# Patient Record
(demographics unavailable — no encounter records)

---

## 2017-05-10 NOTE — [facility]
PROCEDURE 

Screening 3Dmammogram. 

 

HISTORY 

Screening study. 

 

COMPARISON 

Comparison is made with the prior dated 2013 through 2016 

 

 

FINDINGS 

3D tomographic mammography images were obtained in the CC and [facility] 

projections and reviewed on a [employer] workstation. Heterogeneous dense 

breast parenchyma is seen bilaterally. No asymmetric breast masses or 

spiculated lesions or clustering of microcalcification are seen to 

indicate malignancy radiographically. 

 

IMPRESSION 

There are no radiographic indicators for malignancy.Recommend routine 

screening mammography in one year. 

BI-RADS CATEGORY 1: Negative mammogram 

The patient will be entered into the reminder system with a target date of

May 11, 2018. 

This study was interpreted with the benefit of Computerized Aided 

Detection (CAD). 

Mammography is not 100% sensitive in detecting breast cancer. Therefore, a

self breast exam and a clinical breast exam are very important. A negative

mammogram does not negate a clinically suspicious finding and should not 

result in a delay in biopsying a clinically suspicious abnormality. 

Based on current legislation, we are required to report to you the density

of your breast tissue. Although breast density does not affect your risk 

for developing breast cancer, denser breast tissue may decrease the 

ability to detect breast cancer on a standard mammogram. Therefore, 

patients with denser breast tissue may benefit from a 3D screening 

mammogram. Categories are as follows: Category A - breast tissue is 

replaced with fatty tissue and not dense; Category B - scattered 

fibroglandular breast tissue that is mildly dense; Category C - 

heterogeneous breast tissue; Category D - very dense breast tissue. Dense 

breast categories C and D (i.e. denser breast tissue) and sometimes 

categories A and B may benefit from a 3D mammogram. Your breast tissue 

category is a C. 

 

Electronically signed by: Festus Chávez MD (May 10, 2017 15:52:50)

## 2017-05-11 NOTE — [facility]
PROCEDURE 

MRI study of the abdomen with and without contrast 

 

HISTORY 

Further evaluations of solid mass lesion of the left kidney seen on 

outside renal sonogram performed at [facility] dated April 28, 2017. 

 

TECHNIQUE 

Pre and post contrast enhanced MRI sequences of the abdomen were performed

focusing on the kidneys. A total of 7 cc of Gadavist was given 

intravenously. 

 

COMPARISON 

No previous imaging available. Outside report of renal sonogram performed 

at [facility] on April 28, 2017 stated there is a solid mass in the left

kidney measuring 2.3 centimeters. 

 

FINDINGS 

Both kidneys are functioning and no hydronephrosis is seen on either side.

Bilateral renal cysts are seen. The largest is seen on the left side 

anteriorly measuring 23 millimeters. There is a heterogeneous enhancing 

solid exophytic nodule of the lateral mid to upper aspect of the left 

kidney measuring 23 millimeters. This is consistent with a renal neoplasm.

No macroscopic fat is seen within it to indicate an angiomyelolipoma. 

There is a nonenhancing hemorrhagic or proteinaceous 6 millimeter cyst of 

the lower pole of the right kidney. Normal enhancement of both main renal 

veins is seen. No adrenal mass is seen. The pancreas is normal. The spleen

is not enlarged. The liver is not completely visualized in this study. 

There is a small nonenhancing subcentimeter cyst of the right lobe of the 

liver adjacent to the gallbladder fossa. The gallbladder is normal. No 

extrahepatic biliary ductal dilatation is seen. No focal aneurysmal 

dilatation of the abdominal aorta is seen. No enlarged abdominal 

lymphadenopathy is seen. 

 

IMPRESSION 

23 millimeter exophytic solid renal neoplasm of the lateral mid to upper 

aspect of the left kidney. 

 

Electronically signed by: Festus Chávez MD (May 11, 2017 16:16:55)

## 2018-09-28 NOTE — [facility]
MRI of the lumbar spine without contrast 9/28/2018

 

CLINICAL HISTORY: Low back pain which radiates down the right leg.

 

TECHNIQUE: Unenhanced T1-weighted and T2-weighted sagittal and axial and 

inversion recovery sagittal images of the lumbar spine were obtained.

 

FINDINGS: Minimal S-shaped curvature of the thoracolumbar spine is seen. 

Degenerative signal changes and loss of height are seen involving the 

L2-3, L3-4 and L5-S1 discs. Degenerative signal changes are seen within 

the marrow surrounding these discs. The conus medullaris is normal 

morphology, position, and signal characteristics. A 5 mm rounded high 

signal intensity lesion is seen involving the midpole of the right kidney 

on the T2-weighted images. This likely represents a cyst.

 

At the L1-2 disc space there is a minimal generalized disc bulge. 

Degenerative changes are seen involving the facet joints bilaterally. 

These findings do not result in significant central spinal canal or neural

foraminal stenosis.

 

At the L2-3 disc space there is a mild to moderate generalized disc bulge.

Degenerative changes are seen involving the facet joints bilaterally. 

There is mild ligamentum flavum hypertrophy. These findings do not result 

in significant central spinal canal stenosis. No neural foraminal stenosis

is seen.

 

At the L3-4 disc space there is a mild to moderate generalized disc bulge.

This is eccentric to the left. Degenerative changes are seen involving the

facet joints bilaterally. There is mild ligamentum flavum hypertrophy. 

These findings when combined do not result in significant central spinal 

canal stenosis. Mild left neural foraminal stenosis is seen. The right 

neural foramen is patent.

 

At the L4-5 disc space there is a mild generalized disc bulge. 

Degenerative changes are seen involving the facet joints bilaterally. 

There is mild ligamentum flavum hypertrophy. These findings do not result 

in significant central spinal canal or neural foraminal stenosis.

 

At the L5-S1 disc space there is a mild generalized disc bulge. 

Degenerative changes are seen involving the facet joints bilaterally. 

These findings do not result in significant central spinal canal or neural

foraminal stenosis.

 

IMPRESSION: The changes of degenerative disc disease are seen throughout 

the lumbar spine. These findings do not result in significant central 

spinal canal stenosis at any level. Mild left neural foraminal stenosis is

seen at L3-4.

 

Electronically signed by: Sky Robbins MD (9/28/2018 1:01 PM) [id number]